# Patient Record
Sex: FEMALE | Race: WHITE | NOT HISPANIC OR LATINO | Employment: UNEMPLOYED | URBAN - METROPOLITAN AREA
[De-identification: names, ages, dates, MRNs, and addresses within clinical notes are randomized per-mention and may not be internally consistent; named-entity substitution may affect disease eponyms.]

---

## 2019-12-06 ENCOUNTER — APPOINTMENT (OUTPATIENT)
Dept: RADIOLOGY | Facility: MEDICAL CENTER | Age: 34
End: 2019-12-06
Attending: EMERGENCY MEDICINE
Payer: COMMERCIAL

## 2019-12-06 ENCOUNTER — HOSPITAL ENCOUNTER (EMERGENCY)
Facility: MEDICAL CENTER | Age: 34
End: 2019-12-07
Attending: EMERGENCY MEDICINE
Payer: COMMERCIAL

## 2019-12-06 DIAGNOSIS — R11.0 NAUSEA: ICD-10-CM

## 2019-12-06 DIAGNOSIS — K59.00 CONSTIPATION, UNSPECIFIED CONSTIPATION TYPE: ICD-10-CM

## 2019-12-06 DIAGNOSIS — R10.11 RIGHT UPPER QUADRANT ABDOMINAL PAIN: ICD-10-CM

## 2019-12-06 LAB
ALBUMIN SERPL BCP-MCNC: 3.9 G/DL (ref 3.2–4.9)
ALBUMIN/GLOB SERPL: 1.3 G/DL
ALP SERPL-CCNC: 87 U/L (ref 30–99)
ALT SERPL-CCNC: 16 U/L (ref 2–50)
ANION GAP SERPL CALC-SCNC: 8 MMOL/L (ref 0–11.9)
APPEARANCE UR: CLEAR
AST SERPL-CCNC: 15 U/L (ref 12–45)
BASOPHILS # BLD AUTO: 0.3 % (ref 0–1.8)
BASOPHILS # BLD: 0.02 K/UL (ref 0–0.12)
BILIRUB SERPL-MCNC: 0.2 MG/DL (ref 0.1–1.5)
BILIRUB UR QL STRIP.AUTO: NEGATIVE
BUN SERPL-MCNC: 15 MG/DL (ref 8–22)
CALCIUM SERPL-MCNC: 9 MG/DL (ref 8.5–10.5)
CHLORIDE SERPL-SCNC: 111 MMOL/L (ref 96–112)
CO2 SERPL-SCNC: 21 MMOL/L (ref 20–33)
COLOR UR: YELLOW
CREAT SERPL-MCNC: 0.63 MG/DL (ref 0.5–1.4)
EOSINOPHIL # BLD AUTO: 0.11 K/UL (ref 0–0.51)
EOSINOPHIL NFR BLD: 1.5 % (ref 0–6.9)
ERYTHROCYTE [DISTWIDTH] IN BLOOD BY AUTOMATED COUNT: 44.3 FL (ref 35.9–50)
GLOBULIN SER CALC-MCNC: 2.9 G/DL (ref 1.9–3.5)
GLUCOSE SERPL-MCNC: 97 MG/DL (ref 65–99)
GLUCOSE UR STRIP.AUTO-MCNC: NEGATIVE MG/DL
HCG SERPL QL: NEGATIVE
HCT VFR BLD AUTO: 42.6 % (ref 37–47)
HGB BLD-MCNC: 13.9 G/DL (ref 12–16)
IMM GRANULOCYTES # BLD AUTO: 0.01 K/UL (ref 0–0.11)
IMM GRANULOCYTES NFR BLD AUTO: 0.1 % (ref 0–0.9)
KETONES UR STRIP.AUTO-MCNC: NEGATIVE MG/DL
LEUKOCYTE ESTERASE UR QL STRIP.AUTO: NEGATIVE
LIPASE SERPL-CCNC: 25 U/L (ref 11–82)
LYMPHOCYTES # BLD AUTO: 2.45 K/UL (ref 1–4.8)
LYMPHOCYTES NFR BLD: 33.3 % (ref 22–41)
MCH RBC QN AUTO: 31 PG (ref 27–33)
MCHC RBC AUTO-ENTMCNC: 32.6 G/DL (ref 33.6–35)
MCV RBC AUTO: 95.1 FL (ref 81.4–97.8)
MICRO URNS: NORMAL
MONOCYTES # BLD AUTO: 0.72 K/UL (ref 0–0.85)
MONOCYTES NFR BLD AUTO: 9.8 % (ref 0–13.4)
NEUTROPHILS # BLD AUTO: 4.05 K/UL (ref 2–7.15)
NEUTROPHILS NFR BLD: 55 % (ref 44–72)
NITRITE UR QL STRIP.AUTO: NEGATIVE
NRBC # BLD AUTO: 0 K/UL
NRBC BLD-RTO: 0 /100 WBC
PH UR STRIP.AUTO: 6 [PH] (ref 5–8)
PLATELET # BLD AUTO: 220 K/UL (ref 164–446)
PMV BLD AUTO: 10.7 FL (ref 9–12.9)
POTASSIUM SERPL-SCNC: 4.1 MMOL/L (ref 3.6–5.5)
PROT SERPL-MCNC: 6.8 G/DL (ref 6–8.2)
PROT UR QL STRIP: NEGATIVE MG/DL
RBC # BLD AUTO: 4.48 M/UL (ref 4.2–5.4)
RBC UR QL AUTO: NEGATIVE
SODIUM SERPL-SCNC: 140 MMOL/L (ref 135–145)
SP GR UR STRIP.AUTO: 1.01
UROBILINOGEN UR STRIP.AUTO-MCNC: 0.2 MG/DL
WBC # BLD AUTO: 7.4 K/UL (ref 4.8–10.8)

## 2019-12-06 PROCEDURE — 74022 RADEX COMPL AQT ABD SERIES: CPT

## 2019-12-06 PROCEDURE — 83690 ASSAY OF LIPASE: CPT

## 2019-12-06 PROCEDURE — 85025 COMPLETE CBC W/AUTO DIFF WBC: CPT

## 2019-12-06 PROCEDURE — 36415 COLL VENOUS BLD VENIPUNCTURE: CPT

## 2019-12-06 PROCEDURE — 81003 URINALYSIS AUTO W/O SCOPE: CPT

## 2019-12-06 PROCEDURE — 84703 CHORIONIC GONADOTROPIN ASSAY: CPT

## 2019-12-06 PROCEDURE — 76705 ECHO EXAM OF ABDOMEN: CPT

## 2019-12-06 PROCEDURE — 80053 COMPREHEN METABOLIC PANEL: CPT

## 2019-12-06 PROCEDURE — 99285 EMERGENCY DEPT VISIT HI MDM: CPT

## 2019-12-06 PROCEDURE — 700111 HCHG RX REV CODE 636 W/ 250 OVERRIDE (IP): Performed by: EMERGENCY MEDICINE

## 2019-12-06 PROCEDURE — 96372 THER/PROPH/DIAG INJ SC/IM: CPT

## 2019-12-06 RX ORDER — POLYETHYLENE GLYCOL 3350 17 G/17G
17 POWDER, FOR SOLUTION ORAL DAILY
Qty: 5 EACH | Refills: 0 | Status: SHIPPED | OUTPATIENT
Start: 2019-12-06 | End: 2019-12-11

## 2019-12-06 RX ORDER — KETOROLAC TROMETHAMINE 30 MG/ML
30 INJECTION, SOLUTION INTRAMUSCULAR; INTRAVENOUS ONCE
Status: COMPLETED | OUTPATIENT
Start: 2019-12-06 | End: 2019-12-06

## 2019-12-06 RX ORDER — IBUPROFEN 800 MG/1
800 TABLET ORAL EVERY 8 HOURS PRN
COMMUNITY

## 2019-12-06 RX ORDER — KETOROLAC TROMETHAMINE 10 MG/1
10 TABLET, FILM COATED ORAL EVERY 4 HOURS PRN
Qty: 30 TAB | Refills: 0 | Status: SHIPPED | OUTPATIENT
Start: 2019-12-06

## 2019-12-06 RX ORDER — ONDANSETRON 4 MG/1
4 TABLET, ORALLY DISINTEGRATING ORAL ONCE
Status: COMPLETED | OUTPATIENT
Start: 2019-12-06 | End: 2019-12-06

## 2019-12-06 RX ORDER — ONDANSETRON 4 MG/1
4 TABLET, ORALLY DISINTEGRATING ORAL EVERY 6 HOURS PRN
Qty: 10 TAB | Refills: 0 | Status: SHIPPED | OUTPATIENT
Start: 2019-12-06

## 2019-12-06 RX ORDER — ACETAMINOPHEN 325 MG/1
650 TABLET ORAL EVERY 4 HOURS PRN
COMMUNITY

## 2019-12-06 RX ADMIN — ONDANSETRON 4 MG: 4 TABLET, ORALLY DISINTEGRATING ORAL at 21:44

## 2019-12-06 RX ADMIN — KETOROLAC TROMETHAMINE 30 MG: 30 INJECTION, SOLUTION INTRAMUSCULAR at 21:44

## 2019-12-06 SDOH — HEALTH STABILITY: MENTAL HEALTH: HOW OFTEN DO YOU HAVE A DRINK CONTAINING ALCOHOL?: NEVER

## 2019-12-07 VITALS
DIASTOLIC BLOOD PRESSURE: 78 MMHG | HEIGHT: 64 IN | SYSTOLIC BLOOD PRESSURE: 129 MMHG | TEMPERATURE: 96.8 F | RESPIRATION RATE: 16 BRPM | BODY MASS INDEX: 33.42 KG/M2 | HEART RATE: 92 BPM | OXYGEN SATURATION: 98 % | WEIGHT: 195.77 LBS

## 2019-12-07 NOTE — ED TRIAGE NOTES
".  Chief Complaint   Patient presents with   • Abdominal Pain     right side abdominal pain   • Nausea     ./77   Pulse 100   Temp 36 °C (96.8 °F) (Temporal)   Resp 16   Ht 1.626 m (5' 4\")   Wt 88.8 kg (195 lb 12.3 oz)   LMP  (Within Weeks)   SpO2 97%   BMI 33.60 kg/m²     Ambulatory to triage with above complaints, educated on triage process, given urine specimen cup and clean catch instructions, placed in lobby, told to inform staff of any changes in condition.   "

## 2019-12-07 NOTE — ED NOTES
Pt verbalizes understanding of discharge and follow-up instructions. Given Rx X4.  VSS.  All questions answered.  Ambulates to discharge with steady gait.

## 2022-11-30 ENCOUNTER — APPOINTMENT (OUTPATIENT)
Dept: RADIOLOGY | Facility: MEDICAL CENTER | Age: 37
End: 2022-11-30
Attending: EMERGENCY MEDICINE
Payer: COMMERCIAL

## 2022-11-30 ENCOUNTER — HOSPITAL ENCOUNTER (EMERGENCY)
Facility: MEDICAL CENTER | Age: 37
End: 2022-11-30
Attending: EMERGENCY MEDICINE
Payer: COMMERCIAL

## 2022-11-30 VITALS
OXYGEN SATURATION: 96 % | SYSTOLIC BLOOD PRESSURE: 140 MMHG | WEIGHT: 156.97 LBS | HEART RATE: 100 BPM | HEIGHT: 65 IN | TEMPERATURE: 97 F | DIASTOLIC BLOOD PRESSURE: 74 MMHG | BODY MASS INDEX: 26.15 KG/M2 | RESPIRATION RATE: 18 BRPM

## 2022-11-30 DIAGNOSIS — S00.83XA CONTUSION OF FACE, INITIAL ENCOUNTER: ICD-10-CM

## 2022-11-30 DIAGNOSIS — T07.XXXA ABRASIONS OF MULTIPLE SITES: ICD-10-CM

## 2022-11-30 DIAGNOSIS — S09.90XA CLOSED HEAD INJURY, INITIAL ENCOUNTER: ICD-10-CM

## 2022-11-30 DIAGNOSIS — S80.01XA CONTUSION OF RIGHT KNEE, INITIAL ENCOUNTER: ICD-10-CM

## 2022-11-30 DIAGNOSIS — S06.0X0A CONCUSSION WITHOUT LOSS OF CONSCIOUSNESS, INITIAL ENCOUNTER: ICD-10-CM

## 2022-11-30 PROCEDURE — 70486 CT MAXILLOFACIAL W/O DYE: CPT

## 2022-11-30 PROCEDURE — 70450 CT HEAD/BRAIN W/O DYE: CPT

## 2022-11-30 PROCEDURE — 99283 EMERGENCY DEPT VISIT LOW MDM: CPT

## 2022-11-30 PROCEDURE — 700102 HCHG RX REV CODE 250 W/ 637 OVERRIDE(OP)

## 2022-11-30 PROCEDURE — 700111 HCHG RX REV CODE 636 W/ 250 OVERRIDE (IP): Performed by: EMERGENCY MEDICINE

## 2022-11-30 PROCEDURE — A9270 NON-COVERED ITEM OR SERVICE: HCPCS

## 2022-11-30 PROCEDURE — 73564 X-RAY EXAM KNEE 4 OR MORE: CPT | Mod: RT

## 2022-11-30 RX ORDER — OXYCODONE HYDROCHLORIDE AND ACETAMINOPHEN 5; 325 MG/1; MG/1
1 TABLET ORAL ONCE
Status: COMPLETED | OUTPATIENT
Start: 2022-11-30 | End: 2022-11-30

## 2022-11-30 RX ORDER — HYDROCODONE BITARTRATE AND ACETAMINOPHEN 5; 325 MG/1; MG/1
1 TABLET ORAL ONCE
Status: DISCONTINUED | OUTPATIENT
Start: 2022-11-30 | End: 2022-11-30

## 2022-11-30 RX ORDER — ONDANSETRON 4 MG/1
4 TABLET, ORALLY DISINTEGRATING ORAL ONCE
Status: COMPLETED | OUTPATIENT
Start: 2022-11-30 | End: 2022-11-30

## 2022-11-30 RX ADMIN — OXYCODONE AND ACETAMINOPHEN 1 TABLET: 5; 325 TABLET ORAL at 19:31

## 2022-11-30 RX ADMIN — ONDANSETRON 4 MG: 4 TABLET, ORALLY DISINTEGRATING ORAL at 19:31

## 2022-11-30 ASSESSMENT — ENCOUNTER SYMPTOMS
MYALGIAS: 1
LOSS OF CONSCIOUSNESS: 0
HEADACHES: 1

## 2022-11-30 ASSESSMENT — PAIN DESCRIPTION - PAIN TYPE: TYPE: ACUTE PAIN
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
## 2022-12-01 NOTE — DISCHARGE INSTRUCTIONS
Keep abrasions clean and dry, watch for signs of infection.  Return for some pain or other concerns.  Continue over-the-counter Tylenol and or ibuprofen for pain.  Follow-up with your doctor.
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
## 2023-05-17 NOTE — ED PROVIDER NOTES
ED Provider Note    Scribed for Bernard Dyson MD by Mi Dhillon. 12/6/2019, 9:24 PM.    Primary care provider: None noted  Means of arrival: Walk-in  History obtained from: Patient  History limited by: None    CHIEF COMPLAINT  Chief Complaint   Patient presents with   • Abdominal Pain     right side abdominal pain   • Nausea     HPI  Suzette Viramontes is a 34 y.o. female who presents to the Emergency Department for evaluation of chronic right-sided abdominal pain onset 6 months ago. The patient reports that his pain is constant. She reports that she doubled over in pain 3 hours ago while grocery shopping which prompted her to come to the ED. She reports that the abdominal pain radiates into her back tonight. The patient states that the pain is exacerbated with ambulation and breathing. She endorses associated constipation, nausea, chills. She denies any associated vomiting or diarrhea. The patient additionally notes that she has had 2 smaller than baseline bowel movements today.     REVIEW OF SYSTEMS  Pertinent positives include abdominal pain that radiates to her back, nausea, constipation, chills.   Pertinent negatives include no vomiting or diarrhea.    All other systems reviewed and negative.    PAST MEDICAL HISTORY   has a past medical history of Asthma, Depression, and Schizophrenia ([facility]CC).    SURGICAL HISTORY  patient denies any surgical history    SOCIAL HISTORY  Social History     Tobacco Use   • Smoking status: Current Every Day Smoker     Packs/day: 0.00   Substance Use Topics   • Alcohol use: Never     Frequency: Never   • Drug use: Never      Social History     Substance and Sexual Activity   Drug Use Never     FAMILY HISTORY  History reviewed. No pertinent family history.    CURRENT MEDICATIONS  Home Medications     Reviewed by Maria Esther Guzmán R.N. (Registered Nurse) on 12/06/19 at 1822  Med List Status: Partial   Medication Last Dose Status   acetaminophen (TYLENOL) 325  "MG Tab 12/5/2019 Active   ibuprofen (MOTRIN) 800 MG Tab 12/6/2019 Active              ALLERGIES  Allergies   Allergen Reactions   • Levaquin Swelling     Throat swelling   • Pcn [Penicillins] Hives and Vomiting     PHYSICAL EXAM  VITAL SIGNS: /77   Pulse 100   Temp 36 °C (96.8 °F) (Temporal)   Resp 16   Ht 1.626 m (5' 4\")   Wt 88.8 kg (195 lb 12.3 oz)   LMP  (Within Weeks)   SpO2 97%   BMI 33.60 kg/m²     General: Alert, no acute distress  Skin: Warm, dry, normal for ethnicity  Head: Normocephalic, atraumatic. Oral mucousa pink and mildly dry.   Neck: Trachea midline, no tenderness  Eye: PERRL, sclera anicterus.   ENMT: Oral mucosa moist, no pharyngeal erythema or exudate  Cardiovascular: Regular rate and rhythm, No murmur, Normal peripheral perfusion  Respiratory: Lungs CTA, respirations are non-labored, breath sounds are equal  Gastrointestinal: Soft, nontender, non distended. RUQ and RLQ tenderness. Bowel sounds are mildly hypoactive. Left CVA tenderness.   Musculoskeletal: No swelling, no deformity  Neurological: Alert and oriented to person, place, time, and situation  Lymphatics: No lymphadenopathy  Psychiatric: Cooperative, appropriate mood & affect    DIAGNOSTIC STUDIES/PROCEDURES    LABS  Results for orders placed or performed during the hospital encounter of 12/06/19   CBC WITH DIFFERENTIAL   Result Value Ref Range    WBC 7.4 4.8 - 10.8 K/uL    RBC 4.48 4.20 - 5.40 M/uL    Hemoglobin 13.9 12.0 - 16.0 g/dL    Hematocrit 42.6 37.0 - 47.0 %    MCV 95.1 81.4 - 97.8 fL    MCH 31.0 27.0 - 33.0 pg    MCHC 32.6 (L) 33.6 - 35.0 g/dL    RDW 44.3 35.9 - 50.0 fL    Platelet Count 220 164 - 446 K/uL    MPV 10.7 9.0 - 12.9 fL    Neutrophils-Polys 55.00 44.00 - 72.00 %    Lymphocytes 33.30 22.00 - 41.00 %    Monocytes 9.80 0.00 - 13.40 %    Eosinophils 1.50 0.00 - 6.90 %    Basophils 0.30 0.00 - 1.80 %    Immature Granulocytes 0.10 0.00 - 0.90 %    Nucleated RBC 0.00 /100 WBC    Neutrophils (Absolute) 4.05 " 2.00 - 7.15 K/uL    Lymphs (Absolute) 2.45 1.00 - 4.80 K/uL    Monos (Absolute) 0.72 0.00 - 0.85 K/uL    Eos (Absolute) 0.11 0.00 - 0.51 K/uL    Baso (Absolute) 0.02 0.00 - 0.12 K/uL    Immature Granulocytes (abs) 0.01 0.00 - 0.11 K/uL    NRBC (Absolute) 0.00 K/uL   COMP METABOLIC PANEL   Result Value Ref Range    Sodium 140 135 - 145 mmol/L    Potassium 4.1 3.6 - 5.5 mmol/L    Chloride 111 96 - 112 mmol/L    Co2 21 20 - 33 mmol/L    Anion Gap 8.0 0.0 - 11.9    Glucose 97 65 - 99 mg/dL    Bun 15 8 - 22 mg/dL    Creatinine 0.63 0.50 - 1.40 mg/dL    Calcium 9.0 8.5 - 10.5 mg/dL    AST(SGOT) 15 12 - 45 U/L    ALT(SGPT) 16 2 - 50 U/L    Alkaline Phosphatase 87 30 - 99 U/L    Total Bilirubin 0.2 0.1 - 1.5 mg/dL    Albumin 3.9 3.2 - 4.9 g/dL    Total Protein 6.8 6.0 - 8.2 g/dL    Globulin 2.9 1.9 - 3.5 g/dL    A-G Ratio 1.3 g/dL   LIPASE   Result Value Ref Range    Lipase 25 11 - 82 U/L   HCG QUAL SERUM   Result Value Ref Range    Beta-Hcg Qualitative Serum Negative Negative   URINALYSIS,CULTURE IF INDICATED   Result Value Ref Range    Color Yellow     Character Clear     Specific Gravity 1.011 <1.035    Ph 6.0 5.0 - 8.0    Glucose Negative Negative mg/dL    Ketones Negative Negative mg/dL    Protein Negative Negative mg/dL    Bilirubin Negative Negative    Urobilinogen, Urine 0.2 Negative    Nitrite Negative Negative    Leukocyte Esterase Negative Negative    Occult Blood Negative Negative    Micro Urine Req see below    ESTIMATED GFR   Result Value Ref Range    GFR If African American >60 >60 mL/min/1.73 m 2    GFR If Non African American >60 >60 mL/min/1.73 m 2     All labs reviewed by me.    RADIOLOGY  US-RUQ   Final Result      Limited exam due to body habitus.      Hepatic steatosis.         DX-ABDOMEN COMPLETE WITH AP OR PA CXR   Final Result         Diffuse gas-filled mildly dilated small bowel. There is gas in the colon. This could relate to ileus versus mild early partial small bowel obstruction.      Reviewed  "x-ray images, there is bowel gas throughout the colon less likely represent obstruction.  Patient significant stool burden on the right, likely constipation.      The radiologist's interpretation of all radiological studies have been reviewed by me.    COURSE & MEDICAL DECISION MAKING  Pertinent Labs & Imaging studies reviewed. (See chart for details)    9:24 PM - Patient seen and examined at bedside. Patient will be treated with Zofran 4 mg and Toradol 30 mg. Ordered US-RUQ, DX-Abdomen, estimated GFR, CBC with differential, CMP, lipase, HCG Qual Serum, and Urinalysis to evaluate her symptoms. The differential diagnoses include but are not limited to: biliary cholic, pancreatitis, constipation.    11:32 PM - Patient was reevaluated at bedside. She reports feeling improved. She is leaving the ED with unremarkable lab and radiology results. The patient was recommended to eat less surgery as there was evidence of fatty liver. She was given the opportunity to ask questions at this time. The patient understands the plan for discharge and verbalized her agreement.     Patient Vitals for the past 24 hrs:   BP Temp Temp src Pulse Resp SpO2 Height Weight   12/07/19 0006 129/78 -- -- 92 16 98 % -- --   12/06/19 1819 -- -- -- -- -- -- 1.626 m (5' 4\") 88.8 kg (195 lb 12.3 oz)   12/06/19 1811 131/77 36 °C (96.8 °F) Temporal 100 16 97 % -- --         Decision Making:  This is a 34 y.o. year old female who presents with right upper quadrant and right lower quadrant abdominal pain.  Patient notes she has had this on and off for months but was more severe today.  She notes associated constipation as well as nausea but has had no active vomiting.  She is afebrile, nontoxic, no peritoneal signs on the exam.  She has no leukocytosis, her Quintero appendicitis score is only 2, unlikely to represent acute appendicitis patient concerning the chronicity of her symptoms.  Work-up is thankfully unremarkable, radiologist does note there is a " possibility of ileus and history think would not be consistent with obstructive process.  She is obviously quite constipated and I will write her for bowel regimen including Metamucil and MiraLAX.  She is concerned with regard location of her pain for her liver as well, thankfully LFTs are unremarkable though she does have evidence of fatty liver on imaging, consistent with her noting a large amount of sugar in her diet.  Recommend diet modification including decreasing simple carbohydrates such as sugar.  Given chronicity of her symptoms she will need to follow-up with both primary care and if she continues to have discomfort gastroneurology for further evaluation.    The patient will return for new or worsening symptoms and is stable at the time of discharge.    Patient has had high blood pressure while in the emergency department, felt likely secondary to medical condition. Counseled patient to monitor blood pressure at home and follow up with primary care physician.      DISPOSITION:  Patient will be discharged home in stable condition.    FOLLOW UP:  Jean Pierre Diaz M.D.  [address] [id number] [phone]    Schedule an appointment as soon as possible for a visit in 1 week      [facility]  [address] [place] [id number] [phone]  Schedule an appointment as soon as possible for a visit in 1 month  As needed      OUTPATIENT MEDICATIONS:  Discharge Medication List as of 12/6/2019 11:43 PM      START taking these medications    Details   ketorolac (TORADOL) 10 MG Tab Take 1 Tab by mouth every four hours as needed for Moderate Pain., Disp-30 Tab, R-0, Print Rx Paper      ondansetron (ZOFRAN ODT) 4 MG TABLET DISPERSIBLE Take 1 Tab by mouth every 6 hours as needed for Nausea., Disp-10 Tab, R-0, Print Rx Paper      Psyllium (METAMUCIL) 28 % packet Take 1 Packet by mouth 2 times a day., Disp-20 Packet, R-3, Print Rx Paper      polyethylene glycol/lytes  (MIRALAX) Pack Take 1 Packet by mouth every day for 5 days., Disp-5 Each, R-0, Print Rx Paper                 FINAL IMPRESSION  1. Right upper quadrant abdominal pain    2. Constipation, unspecified constipation type    3. Nausea          Mi BARRETO (Aquiles), am scribing for, and in the presence of, Bernard Dyson MD.    Electronically signed by: Mi Dhillon (Natalyaibe), 12/6/2019    IBernard MD personally performed the services described in this documentation, as scribed by Mi Dhillon in my presence, and it is both accurate and complete.    C.    The note accurately reflects work and decisions made by me.  Bernard Dyson  12/7/2019  1:20 AM     Thalidomide Counseling: I discussed with the patient the risks of thalidomide including but not limited to birth defects, anxiety, weakness, chest pain, dizziness, cough and severe allergy.